# Patient Record
Sex: MALE | Race: WHITE | Employment: OTHER | URBAN - METROPOLITAN AREA
[De-identification: names, ages, dates, MRNs, and addresses within clinical notes are randomized per-mention and may not be internally consistent; named-entity substitution may affect disease eponyms.]

---
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
## 2024-06-18 NOTE — ED NOTES
I have reviewed discharge instructions with the patient. The patient verbalized understanding.   Arm band placed in shred it Abdominal Pain, N/V/D (Pediatric)